# Patient Record
Sex: FEMALE | Race: WHITE | NOT HISPANIC OR LATINO | Employment: OTHER | URBAN - NONMETROPOLITAN AREA
[De-identification: names, ages, dates, MRNs, and addresses within clinical notes are randomized per-mention and may not be internally consistent; named-entity substitution may affect disease eponyms.]

---

## 2017-07-24 ENCOUNTER — HOSPITAL ENCOUNTER (OUTPATIENT)
Dept: LAB | Facility: MEDICAL CENTER | Age: 57
End: 2017-07-24
Attending: SPECIALIST
Payer: COMMERCIAL

## 2017-07-24 ENCOUNTER — NON-PROVIDER VISIT (OUTPATIENT)
Dept: URGENT CARE | Facility: PHYSICIAN GROUP | Age: 57
End: 2017-07-24
Payer: COMMERCIAL

## 2017-07-24 ENCOUNTER — APPOINTMENT (OUTPATIENT)
Dept: RADIOLOGY | Facility: IMAGING CENTER | Age: 57
End: 2017-07-24
Attending: SPECIALIST
Payer: COMMERCIAL

## 2017-07-24 DIAGNOSIS — G89.29 CHRONIC HIP PAIN, UNSPECIFIED LATERALITY: ICD-10-CM

## 2017-07-24 DIAGNOSIS — M25.559 CHRONIC HIP PAIN, UNSPECIFIED LATERALITY: ICD-10-CM

## 2017-07-24 DIAGNOSIS — M25.559 ARTHRALGIA OF HIP, UNSPECIFIED LATERALITY: ICD-10-CM

## 2017-07-24 DIAGNOSIS — G89.29 CHRONIC BACK PAIN, UNSPECIFIED BACK LOCATION, UNSPECIFIED BACK PAIN LATERALITY: ICD-10-CM

## 2017-07-24 DIAGNOSIS — M54.5 CHRONIC LOW BACK PAIN, UNSPECIFIED BACK PAIN LATERALITY, WITH SCIATICA PRESENCE UNSPECIFIED: ICD-10-CM

## 2017-07-24 DIAGNOSIS — M54.9 CHRONIC BACK PAIN, UNSPECIFIED BACK LOCATION, UNSPECIFIED BACK PAIN LATERALITY: ICD-10-CM

## 2017-07-24 DIAGNOSIS — G89.29 CHRONIC LOW BACK PAIN, UNSPECIFIED BACK PAIN LATERALITY, WITH SCIATICA PRESENCE UNSPECIFIED: ICD-10-CM

## 2017-07-24 LAB
ALBUMIN SERPL BCP-MCNC: 3.9 G/DL (ref 3.2–4.9)
ALBUMIN/GLOB SERPL: 1.2 G/DL
ALP SERPL-CCNC: 84 U/L (ref 30–99)
ALT SERPL-CCNC: 31 U/L (ref 2–50)
ANION GAP SERPL CALC-SCNC: 4 MMOL/L (ref 0–11.9)
AST SERPL-CCNC: 29 U/L (ref 12–45)
BASOPHILS # BLD AUTO: 1.1 % (ref 0–1.8)
BASOPHILS # BLD: 0.08 K/UL (ref 0–0.12)
BILIRUB SERPL-MCNC: 0.7 MG/DL (ref 0.1–1.5)
BUN SERPL-MCNC: 14 MG/DL (ref 8–22)
CALCIUM SERPL-MCNC: 9.7 MG/DL (ref 8.5–10.5)
CHLORIDE SERPL-SCNC: 109 MMOL/L (ref 96–112)
CO2 SERPL-SCNC: 29 MMOL/L (ref 20–33)
CREAT SERPL-MCNC: 0.52 MG/DL (ref 0.5–1.4)
EOSINOPHIL # BLD AUTO: 0.23 K/UL (ref 0–0.51)
EOSINOPHIL NFR BLD: 3.2 % (ref 0–6.9)
ERYTHROCYTE [DISTWIDTH] IN BLOOD BY AUTOMATED COUNT: 43.8 FL (ref 35.9–50)
ERYTHROCYTE [SEDIMENTATION RATE] IN BLOOD BY WESTERGREN METHOD: 5 MM/HOUR (ref 0–30)
GFR SERPL CREATININE-BSD FRML MDRD: >60 ML/MIN/1.73 M 2
GLOBULIN SER CALC-MCNC: 3.2 G/DL (ref 1.9–3.5)
GLUCOSE SERPL-MCNC: 110 MG/DL (ref 65–99)
HCT VFR BLD AUTO: 46.7 % (ref 37–47)
HGB BLD-MCNC: 15.6 G/DL (ref 12–16)
IMM GRANULOCYTES # BLD AUTO: 0.01 K/UL (ref 0–0.11)
IMM GRANULOCYTES NFR BLD AUTO: 0.1 % (ref 0–0.9)
LYMPHOCYTES # BLD AUTO: 3.57 K/UL (ref 1–4.8)
LYMPHOCYTES NFR BLD: 50.2 % (ref 22–41)
MCH RBC QN AUTO: 31.8 PG (ref 27–33)
MCHC RBC AUTO-ENTMCNC: 33.4 G/DL (ref 33.6–35)
MCV RBC AUTO: 95.3 FL (ref 81.4–97.8)
MONOCYTES # BLD AUTO: 0.48 K/UL (ref 0–0.85)
MONOCYTES NFR BLD AUTO: 6.8 % (ref 0–13.4)
NEUTROPHILS # BLD AUTO: 2.74 K/UL (ref 2–7.15)
NEUTROPHILS NFR BLD: 38.6 % (ref 44–72)
NRBC # BLD AUTO: 0 K/UL
NRBC BLD AUTO-RTO: 0 /100 WBC
PLATELET # BLD AUTO: 280 K/UL (ref 164–446)
PMV BLD AUTO: 9.5 FL (ref 9–12.9)
POTASSIUM SERPL-SCNC: 4.4 MMOL/L (ref 3.6–5.5)
PROT SERPL-MCNC: 7.1 G/DL (ref 6–8.2)
RBC # BLD AUTO: 4.9 M/UL (ref 4.2–5.4)
RHEUMATOID FACT SER IA-ACNC: <10 IU/ML (ref 0–14)
SODIUM SERPL-SCNC: 142 MMOL/L (ref 135–145)
TSH SERPL DL<=0.005 MIU/L-ACNC: 1.29 UIU/ML (ref 0.3–3.7)
WBC # BLD AUTO: 7.1 K/UL (ref 4.8–10.8)

## 2017-07-24 PROCEDURE — 36415 COLL VENOUS BLD VENIPUNCTURE: CPT | Mod: GA

## 2017-07-24 PROCEDURE — 84443 ASSAY THYROID STIM HORMONE: CPT | Mod: GA

## 2017-07-24 PROCEDURE — 86431 RHEUMATOID FACTOR QUANT: CPT

## 2017-07-24 PROCEDURE — 82652 VIT D 1 25-DIHYDROXY: CPT | Mod: GA

## 2017-07-24 PROCEDURE — 72100 X-RAY EXAM L-S SPINE 2/3 VWS: CPT | Mod: TC | Performed by: PHYSICIAN ASSISTANT

## 2017-07-24 PROCEDURE — 80053 COMPREHEN METABOLIC PANEL: CPT

## 2017-07-24 PROCEDURE — 86038 ANTINUCLEAR ANTIBODIES: CPT

## 2017-07-24 PROCEDURE — 85025 COMPLETE CBC W/AUTO DIFF WBC: CPT | Mod: GA

## 2017-07-24 PROCEDURE — 72202 X-RAY EXAM SI JOINTS 3/> VWS: CPT | Mod: TC | Performed by: PHYSICIAN ASSISTANT

## 2017-07-24 PROCEDURE — 85652 RBC SED RATE AUTOMATED: CPT

## 2017-07-26 LAB
1,25(OH)2D3 SERPL-MCNC: 37 PG/ML (ref 19.9–79.3)
NUCLEAR IGG SER QL IA: NORMAL

## 2018-01-29 ENCOUNTER — NON-PROVIDER VISIT (OUTPATIENT)
Dept: URGENT CARE | Facility: PHYSICIAN GROUP | Age: 58
End: 2018-01-29
Payer: MEDICARE

## 2018-01-29 ENCOUNTER — APPOINTMENT (OUTPATIENT)
Dept: RADIOLOGY | Facility: IMAGING CENTER | Age: 58
End: 2018-01-29
Attending: SPECIALIST
Payer: COMMERCIAL

## 2018-01-29 DIAGNOSIS — R07.9 CHEST PAIN, UNSPECIFIED TYPE: ICD-10-CM

## 2018-01-29 PROCEDURE — 71046 X-RAY EXAM CHEST 2 VIEWS: CPT | Mod: 26 | Performed by: PHYSICIAN ASSISTANT

## 2018-01-31 ENCOUNTER — OFFICE VISIT (OUTPATIENT)
Dept: URGENT CARE | Facility: PHYSICIAN GROUP | Age: 58
End: 2018-01-31
Payer: COMMERCIAL

## 2018-01-31 VITALS
SYSTOLIC BLOOD PRESSURE: 126 MMHG | BODY MASS INDEX: 21.06 KG/M2 | DIASTOLIC BLOOD PRESSURE: 78 MMHG | OXYGEN SATURATION: 98 % | HEIGHT: 65 IN | TEMPERATURE: 98.6 F | HEART RATE: 82 BPM | WEIGHT: 126.4 LBS | RESPIRATION RATE: 12 BRPM

## 2018-01-31 DIAGNOSIS — R05.9 COUGH: ICD-10-CM

## 2018-01-31 DIAGNOSIS — Z71.6 ENCOUNTER FOR SMOKING CESSATION COUNSELING: ICD-10-CM

## 2018-01-31 DIAGNOSIS — R06.02 SHORTNESS OF BREATH: ICD-10-CM

## 2018-01-31 DIAGNOSIS — M94.0 COSTOCHONDRITIS: ICD-10-CM

## 2018-01-31 PROCEDURE — 99406 BEHAV CHNG SMOKING 3-10 MIN: CPT | Performed by: NURSE PRACTITIONER

## 2018-01-31 PROCEDURE — 99204 OFFICE O/P NEW MOD 45 MIN: CPT | Mod: 25 | Performed by: NURSE PRACTITIONER

## 2018-01-31 RX ORDER — ALBUTEROL SULFATE 90 UG/1
2 AEROSOL, METERED RESPIRATORY (INHALATION) EVERY 6 HOURS PRN
Qty: 1 INHALER | Refills: 0 | Status: SHIPPED | OUTPATIENT
Start: 2018-01-31

## 2018-01-31 ASSESSMENT — ENCOUNTER SYMPTOMS
SORE THROAT: 0
MYALGIAS: 0
SHORTNESS OF BREATH: 0
JOINT SWELLING: 0
EYE PAIN: 0
DIZZINESS: 0
CHILLS: 0
WEAKNESS: 0
FEVER: 0
COUGH: 1
NAUSEA: 0
PAIN: 1
NUMBNESS: 0
VOMITING: 0

## 2018-01-31 NOTE — PATIENT INSTRUCTIONS
Costochondritis  Costochondritis, sometimes called Tietze syndrome, is a swelling and irritation (inflammation) of the tissue (cartilage) that connects your ribs with your breastbone (sternum). It causes pain in the chest and rib area. Costochondritis usually goes away on its own over time. It can take up to 6 weeks or longer to get better, especially if you are unable to limit your activities.  CAUSES   Some cases of costochondritis have no known cause. Possible causes include:  · Injury (trauma).  · Exercise or activity such as lifting.  · Severe coughing.  SIGNS AND SYMPTOMS  · Pain and tenderness in the chest and rib area.  · Pain that gets worse when coughing or taking deep breaths.  · Pain that gets worse with specific movements.  DIAGNOSIS   Your health care provider will do a physical exam and ask about your symptoms. Chest X-rays or other tests may be done to rule out other problems.  TREATMENT   Costochondritis usually goes away on its own over time. Your health care provider may prescribe medicine to help relieve pain.  HOME CARE INSTRUCTIONS   · Avoid exhausting physical activity. Try not to strain your ribs during normal activity. This would include any activities using chest, abdominal, and side muscles, especially if heavy weights are used.  · Apply ice to the affected area for the first 2 days after the pain begins.  ¨ Put ice in a plastic bag.  ¨ Place a towel between your skin and the bag.  ¨ Leave the ice on for 20 minutes, 2-3 times a day.  · Only take over-the-counter or prescription medicines as directed by your health care provider.  SEEK MEDICAL CARE IF:  · You have redness or swelling at the rib joints. These are signs of infection.  · Your pain does not go away despite rest or medicine.  SEEK IMMEDIATE MEDICAL CARE IF:   · Your pain increases or you are very uncomfortable.  · You have shortness of breath or difficulty breathing.  · You cough up blood.  · You have worse chest pains,  sweating, or vomiting.  · You have a fever or persistent symptoms for more than 2-3 days.  · You have a fever and your symptoms suddenly get worse.  MAKE SURE YOU:   · Understand these instructions.  · Will watch your condition.  · Will get help right away if you are not doing well or get worse.     This information is not intended to replace advice given to you by your health care provider. Make sure you discuss any questions you have with your health care provider.     Document Released: 09/27/2006 Document Revised: 10/08/2014 Document Reviewed: 07/22/2014  [employer] Patient Education ©2016 [employer]

## 2018-01-31 NOTE — PROGRESS NOTES
Subjective:     Alexandria Nevarez is a 57 y.o. female who presents for Rib Injury (Rt side, painful)       Pain   This is a new problem. The current episode started in the past 7 days. The problem occurs constantly. The problem has been unchanged. Associated symptoms include coughing. Pertinent negatives include no chest pain, chills, fever, joint swelling, myalgias, nausea, numbness, rash, sore throat, vomiting or weakness. The symptoms are aggravated by walking, twisting, standing, bending and coughing. She has tried nothing for the symptoms. The treatment provided no relief.   Cough   This is a new problem. The current episode started in the past 7 days. The problem has been unchanged. The problem occurs constantly. The cough is non-productive. Pertinent negatives include no chest pain, chills, fever, myalgias, rash, sore throat or shortness of breath. Nothing aggravates the symptoms. Risk factors for lung disease include smoking/tobacco exposure. She has tried nothing for the symptoms. The treatment provided no relief. There is no history of bronchitis or pneumonia.     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 at SURGERY [facility]   • CERVICAL FUSION POSTERIOR  4/27/2011    Performed by JOEL BLACK at SURGERY [facility]   • CERVICAL LAMINECTOMY POSTERIOR  4/27/2011    Performed by JOEL BLACK at SURGERY [facility]   • CERVICAL DECOMPRESSION POSTERIOR  4/27/2011    Performed by JOEL BLACK  at SURGERY [facility] ORS   • INJ,SACROILIAC,ANES/STEROID Bilateral 1/21/2011    Procedure: W/FLUORO;  Surgeon: Ben Matute D.O.;  Location: SURGERY [facility];  Service: Pain Management   • FLUOROGUIDE FOR SPINAL INJECT  1/21/2011    Procedure: FLUOROGUIDE FOR SPINAL INJ;  Surgeon: Ben Matute D.O.;  Location: SURGERY [facility];  Service:    • LARYNGOSCOPY  9/23/2010    Performed by THOMAS BLAKC at SURGERY SAME DAY [facility] ORS   • VOCAL CORD STRIPPING  9/23/2010    Performed by THOMAS BLACK at SURGERY SAME DAY [facility] ORS   • OTHER ORTHOPEDIC SURGERY  2002    rig hip replacement   • ABDOMINAL HYSTERECTOMY TOTAL  1980, 1995    cervical cancer   • HIP ARTHROPLASTY TOTAL      RIGHT     Social History     Social History   • Marital status:      Spouse name: N/A   • Number of children: N/A   • Years of education: N/A     Occupational History   • Not on file.     Social History Main Topics   • Smoking status: Current Every Day Smoker     Packs/day: 1.00     Years: 20.00     Types: Cigarettes   • Smokeless tobacco: Never Used      Comment: 1 1/2 pack/day FOR 25 YEARS    • Alcohol use No   • Drug use: No   • Sexual activity: No     Other Topics Concern   • Not on file     Social History Narrative   • No narrative on file      Family History   Problem Relation Age of Onset   • Diabetes     • Heart Disease     • Stroke     • Arthritis      Review of Systems   Constitutional: Negative for chills and fever.   HENT: Negative for sore throat.    Eyes: Negative for pain.   Respiratory: Positive for cough. Negative for shortness of breath.    Cardiovascular: Negative for chest pain.   Gastrointestinal: Negative for nausea and vomiting.   Genitourinary: Negative for hematuria.   Musculoskeletal: Negative for joint swelling and myalgias.        Right rib pain     Skin: Negative for rash.   Neurological: Negative for dizziness, weakness and numbness.     Allergies   Allergen Reactions   •  "Asa [Aspirin]      Nausea and vomiting, GI bleed   • Elavil [Amitriptyline Hcl]      GI upset   • Gabapentin Vomiting   • Nsaids      GI Bleeding, Nausea/Vomiting      Objective:   /78   Pulse 82   Temp 37 °C (98.6 °F)   Resp 12   Ht 1.651 m (5' 5\")   Wt 57.3 kg (126 lb 6.4 oz)   SpO2 98%   BMI 21.03 kg/m²   Physical Exam   Constitutional: She is oriented to person, place, and time. She appears well-developed and well-nourished. No distress.   HENT:   Head: Normocephalic and atraumatic.   Right Ear: Tympanic membrane normal.   Left Ear: Tympanic membrane normal.   Nose: Nose normal. Right sinus exhibits no maxillary sinus tenderness and no frontal sinus tenderness. Left sinus exhibits no maxillary sinus tenderness and no frontal sinus tenderness.   Mouth/Throat: Uvula is midline, oropharynx is clear and moist and mucous membranes are normal. No posterior oropharyngeal edema, posterior oropharyngeal erythema or tonsillar abscesses. No tonsillar exudate.   Eyes: Conjunctivae and EOM are normal. Pupils are equal, round, and reactive to light. Right eye exhibits no discharge. Left eye exhibits no discharge.   Cardiovascular: Normal rate and regular rhythm.    No murmur heard.  Pulmonary/Chest: Effort normal and breath sounds normal. No respiratory distress. She has no decreased breath sounds. She has no wheezes. She has no rhonchi. She has no rales.   Abdominal: Soft. She exhibits no distension. There is no tenderness.   Musculoskeletal:   Significant amount of tenderness and pain elicited on palpation of the right ribs and intercostal spaces. No edema, swelling, erythema, ecchymosis noted.   Neurological: She is alert and oriented to person, place, and time. She has normal reflexes. No sensory deficit.   Skin: Skin is warm, dry and intact.   Psychiatric: She has a normal mood and affect.         Assessment/Plan:   Assessment    1. Costochondritis  2. Cough  3. Shortness of breath  - albuterol 108 (90 Base) " MCG/ACT Aero Soln inhalation aerosol; Inhale 2 Puffs by mouth every 6 hours as needed for Shortness of Breath.  Dispense: 1 Inhaler; Refill: 0    Lung sounds clear to auscultation bilaterally, patient had x-ray done on 1/29/18  Xray results  The heart is normal in size.  No pulmonary infiltrates or consolidations are noted.  No pleural effusions are appreciated.  There is biapical pleural-parenchymal scarring. There is again scarring involving the parenchyma and pleura in the right lung base. No pneumothorax. No rib fracture.  Advised patient to use over-the-counter cough suppressants. Patient unable to tolerate taking aspirin and/or ibuprofen. Patient does have prescriptions for narcotics. Advised patient to rest, gentle stretching, time for pain to subside. Provided patient with education handout of diagnoses.    Patient declined referral for pulmonologist. Patient does not have the money at this time to be seen by a specialist for ongoing shortness of breath. Patient isn't current every day smoker and not want to help quitting.     4. Encounter for smoking cessation counseling  Smoking cessation was discussed today for longer than 3 min. OTC Smoking cessation aids were discussed and pt. will consider such, however is not ready to quit at this time.       Patient given precautionary s/sx that mandate immediate follow up and evaluation in the ED. Advised of risks of not doing so.    DDX, Supportive care, and indications for immediate follow-up discussed with patient.    Instructed to return to clinic or nearest emergency department if we are not available for any change in condition, further concerns, or worsening of symptoms.    The patient demonstrated a good understanding and agreed with the treatment plan.

## 2018-03-28 ENCOUNTER — HOSPITAL ENCOUNTER (OUTPATIENT)
Dept: LAB | Facility: MEDICAL CENTER | Age: 58
End: 2018-03-28
Attending: PHYSICIAN ASSISTANT
Payer: COMMERCIAL

## 2018-03-28 PROCEDURE — 36415 COLL VENOUS BLD VENIPUNCTURE: CPT

## 2018-03-28 PROCEDURE — 82495 ASSAY OF CHROMIUM: CPT

## 2018-03-30 LAB — CR SERPL-MCNC: <1 UG/L

## 2018-04-11 ENCOUNTER — APPOINTMENT (OUTPATIENT)
Dept: RADIOLOGY | Facility: MEDICAL CENTER | Age: 58
End: 2018-04-11
Attending: PHYSICIAN ASSISTANT
Payer: COMMERCIAL

## 2019-03-06 ENCOUNTER — APPOINTMENT (OUTPATIENT)
Dept: RADIOLOGY | Facility: IMAGING CENTER | Age: 59
End: 2019-03-06
Attending: SPECIALIST
Payer: COMMERCIAL

## 2019-03-06 ENCOUNTER — NON-PROVIDER VISIT (OUTPATIENT)
Dept: URGENT CARE | Facility: PHYSICIAN GROUP | Age: 59
End: 2019-03-06
Payer: COMMERCIAL

## 2019-03-06 DIAGNOSIS — M25.552 PAIN OF BOTH HIP JOINTS: ICD-10-CM

## 2019-03-06 DIAGNOSIS — M25.551 PAIN OF BOTH HIP JOINTS: ICD-10-CM

## 2019-03-06 PROCEDURE — 73522 X-RAY EXAM HIPS BI 3-4 VIEWS: CPT | Mod: TC,FY | Performed by: FAMILY MEDICINE

## 2019-08-22 ENCOUNTER — OFFICE VISIT (OUTPATIENT)
Dept: MEDICAL GROUP | Facility: PHYSICIAN GROUP | Age: 59
End: 2019-08-22
Payer: COMMERCIAL

## 2019-08-22 VITALS
WEIGHT: 128 LBS | SYSTOLIC BLOOD PRESSURE: 120 MMHG | HEART RATE: 75 BPM | RESPIRATION RATE: 12 BRPM | BODY MASS INDEX: 21.33 KG/M2 | HEIGHT: 65 IN | TEMPERATURE: 99.4 F | DIASTOLIC BLOOD PRESSURE: 82 MMHG | OXYGEN SATURATION: 97 %

## 2019-08-22 DIAGNOSIS — M25.551 RIGHT HIP PAIN: ICD-10-CM

## 2019-08-22 DIAGNOSIS — Z85.41 HISTORY OF CERVICAL CANCER: ICD-10-CM

## 2019-08-22 DIAGNOSIS — T56.894S: ICD-10-CM

## 2019-08-22 DIAGNOSIS — Z13.6 SCREENING FOR CARDIOVASCULAR CONDITION: ICD-10-CM

## 2019-08-22 DIAGNOSIS — Z12.39 BREAST CANCER SCREENING: ICD-10-CM

## 2019-08-22 DIAGNOSIS — N95.0 POSTMENOPAUSAL BLEEDING: ICD-10-CM

## 2019-08-22 DIAGNOSIS — Z13.0 ENCOUNTER FOR SCREENING FOR HEMATOLOGIC DISORDER: ICD-10-CM

## 2019-08-22 PROCEDURE — 99214 OFFICE O/P EST MOD 30 MIN: CPT | Performed by: NURSE PRACTITIONER

## 2019-08-22 RX ORDER — BACLOFEN 10 MG/1
TABLET ORAL
Refills: 2 | COMMUNITY
Start: 2019-08-15

## 2019-08-22 RX ORDER — TRAMADOL HYDROCHLORIDE 50 MG/1
TABLET ORAL
Refills: 2 | COMMUNITY
Start: 2019-08-16

## 2019-08-22 ASSESSMENT — PAIN SCALES - GENERAL: PAINLEVEL: 9=SEVERE PAIN

## 2019-08-22 ASSESSMENT — PATIENT HEALTH QUESTIONNAIRE - PHQ9: CLINICAL INTERPRETATION OF PHQ2 SCORE: 0

## 2019-08-22 NOTE — PROGRESS NOTES
"Chief Complaint   Patient presents with   • Vaginal Pain     bleeding after sex         This is a 59 y.o.female patient that presents today with the following: vaginal bleeding after intercourse.  Establish care    Postmenopausal bleeding  Patient having pain and bleeding after intercourse. This has been going on for the past 2 years, worsening as of late, happening everytime after intercourse. Does not have bleed when not active. Does have vaginal pain off and on, even when not having intercourse. Reports intercourse that is not \"rough\" no other equipment used.  Upon physical examination with speculum there is no evidence of abrasion or source of bleeding.  Did discuss with her in the setting of her history of cervical cancer as well as bleeding with intercourse will refer to OB/GYN for further evaluation I will also get pelvic ultrasound.    History of Cervical Cancer  See additional notes, patient does have history of cervical cancer in her 20s in which she underwent partial hysterectomy and then a subsequent full hysterectomy.  She is not currently followed by OB/GYN, referral has been placed especially in the setting of vaginal bleeding after intercourse.    Right Hip Pain  Patient does have chronic pain in her right hip status post hip replacement, she reports to me that she was also exposed to cobalt because of the hip implants and is requesting a cobalt level, this is been ordered.  She is also followed by pain management, on tramadol.    Poisoning, cobalt  See additional notes, patient states she is exposed to cobalt with her hip implants and she has routine cobalt levels drawn, lab ordered.      No visits with results within 1 Month(s) from this visit.   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 at SURGERY [facility] ORS   • CERVICAL FUSION POSTERIOR  4/27/2011    Performed by JOEL BLACK at SURGERY [facility] ORS   • CERVICAL LAMINECTOMY POSTERIOR  4/27/2011    Performed by JOEL BLACK at [facility]   • CERVICAL DECOMPRESSION POSTERIOR  4/27/2011    Performed by JOEL BLACK at [facility]   • INJ,SACROILIAC,ANES/STEROID Bilateral 1/21/2011    Procedure: W/FLUORO;  Surgeon: Ben Matute D.O.;  Location: SURGERY [facility] ORS;  Service: Pain Management   • FLUOROGUIDE FOR SPINAL INJECT  1/21/2011    Procedure: FLUOROGUIDE FOR SPINAL INJ;  Surgeon: Ben Matute D.O.;  Location: SURGERY [facility];  Service:    • LARYNGOSCOPY  9/23/2010    Performed by THOMAS BLACK at SURGERY SAME DAY [facility] ORS   • VOCAL CORD STRIPPING  9/23/2010    Performed by THOMAS BLACK at SURGERY SAME DAY [facility] ORS   • OTHER ORTHOPEDIC SURGERY  2002    rig hip replacement   • ABDOMINAL HYSTERECTOMY TOTAL  1980, 1995    cervical cancer   • HIP ARTHROPLASTY TOTAL      RIGHT       Family History   Problem Relation Age of Onset   • Diabetes Unknown    • Heart Disease Unknown    • Stroke Unknown    • Arthritis Unknown        Asa [aspirin]; Elavil [amitriptyline hcl]; Gabapentin; and Nsaids    Current Outpatient Medications Ordered in Epic   Medication Sig Dispense Refill   • tramadol (ULTRAM) 50 MG Tab TAKE 1 TABLET BY MOUTH FOUR TIMES A DAY. DNF 08/16,M96.1  2   • baclofen (LIORESAL) 10 MG Tab TAKE 1 TABLET BY  "MOUTH THREE TIMES A DAY FOR MUSCLE RELAXATION  2   • Amitriptyline HCl (ELAVIL PO) Take  by mouth.     • albuterol 108 (90 Base) MCG/ACT Aero Soln inhalation aerosol Inhale 2 Puffs by mouth every 6 hours as needed for Shortness of Breath. (Patient not taking: Reported on 8/22/2019) 1 Inhaler 0   • oxycodone (OXY-IR) 15 MG immediate release tablet Take 15 mg by mouth every 8 hours as needed.     • morphine SR (MS CONTIN) 30 MG TBCR tablet Take 30 mg by mouth 3 times a day.       No current [facility]-ordered facility-administered medications on file.        Constitutional ROS: No unexpected change in weight, No weakness, No unexplained fevers, sweats, or chills  Pulmonary ROS: No chronic cough, sputum, or hemoptysis, No shortness of breath, No recent change in breathing, Positive for smoker   Cardiovascular ROS: No chest pain, No edema, No palpitations  Gastrointestinal ROS: No abdominal pain, No nausea, vomiting, diarrhea, or constipation  Musculoskeletal/Extremities ROS: positive for chronic hip pain  Neurologic ROS: Normal development, No seizures, No weakness   ROS: positive per HPI    Physical exam:  /82 (BP Location: Right arm, Patient Position: Sitting, BP Cuff Size: Adult)   Pulse 75   Temp 37.4 °C (99.4 °F) (Temporal)   Resp 12   Ht 1.651 m (5' 5\")   Wt 58.1 kg (128 lb)   SpO2 97%   Breastfeeding? No   BMI 21.30 kg/m²   General Appearance: pleasant older female appearing older than stated age, alert, no distress, well nourished, well groomed  Skin: Skin color, texture, turgor normal. No rashes or lesions.  Lungs: negative findings: normal respiratory rate and rhythm, normal effort  Musculoskeletal: positive findings: limited ROM to bilateral hips, impaired gait, ambulating with cane  Neurologic: intact  Pelvic: negative findings: external genitalia, cervix surgically removed, no evidence of source of bleeding.     Medical decision making/discussion: pt will be referred to GYN in the setting of " post-coital bleeding as well as history of cervical cancer. Will also get pelvic US. Multiple labs ordered in addition to cobalt levels as mentioned above. Due for mammogram, this was ordered. She will follow up with me in 6-8 weeks, sooner if needed    Alexandria was seen today for vaginal pain.    Diagnoses and all orders for this visit:    Postmenopausal bleeding  -     US-PELVIC TRANSVAGINAL ONLY; Future  -     REFERRAL TO GYNECOLOGY    History of cervical cancer  -     US-PELVIC TRANSVAGINAL ONLY; Future  -     REFERRAL TO GYNECOLOGY    Right hip pain    [place] poisoning, undetermined intent, sequela  -     COBALT, BLOOD; Future    Breast cancer screening  -     MA-SCREEN MAMMO W/CAD-BILAT; Future    Screening for cardiovascular condition  -     Comp Metabolic Panel; Future  -     Lipid Profile; Future    Encounter for screening for hematologic disorder  -     CBC WITH DIFFERENTIAL; Future        Return in about 6 weeks (around 10/3/2019) for Follow-up, Discuss Labs.        Please note that this dictation was created using voice recognition software. I have made every reasonable attempt to correct obvious errors, but I expect that there are errors of grammar and possibly content that I did not discover before finalizing the note.

## 2019-08-22 NOTE — ASSESSMENT & PLAN NOTE
"Patient having pain and bleeding after intercourse. This has been going on for the past 2 years, worsening as of late, happening everytime after intercourse. Does not have bleed when not active. Does have vaginal pain off and on, even when not having intercourse. Reports intercourse that is not \"rough\" no other equipment used.  Upon physical examination with speculum there is no evidence of abrasion or source of bleeding.  Did discuss with her in the setting of her history of cervical cancer as well as bleeding with intercourse will refer to OB/GYN for further evaluation I will also get pelvic ultrasound.  "

## 2019-08-26 PROBLEM — T56.891A: Status: ACTIVE | Noted: 2019-08-26

## 2019-08-26 NOTE — ASSESSMENT & PLAN NOTE
See additional notes, patient states she is exposed to cobalt with her hip implants and she has routine cobalt levels drawn, lab ordered.

## 2019-08-26 NOTE — ASSESSMENT & PLAN NOTE
See additional notes, patient does have history of cervical cancer in her 20s in which she underwent partial hysterectomy and then a subsequent full hysterectomy.  She is not currently followed by OB/GYN, referral has been placed especially in the setting of vaginal bleeding after intercourse.

## 2019-08-26 NOTE — ASSESSMENT & PLAN NOTE
Patient does have chronic pain in her right hip status post hip replacement, she reports to me that she was also exposed to cobalt because of the hip implants and is requesting a cobalt level, this is been ordered.  She is also followed by pain management, on tramadol.

## 2019-09-04 ENCOUNTER — HOSPITAL ENCOUNTER (OUTPATIENT)
Dept: LAB | Facility: MEDICAL CENTER | Age: 59
End: 2019-09-04
Attending: NURSE PRACTITIONER
Payer: COMMERCIAL

## 2019-09-04 DIAGNOSIS — T56.894S: ICD-10-CM

## 2019-09-04 DIAGNOSIS — Z13.6 SCREENING FOR CARDIOVASCULAR CONDITION: ICD-10-CM

## 2019-09-04 DIAGNOSIS — Z13.0 ENCOUNTER FOR SCREENING FOR HEMATOLOGIC DISORDER: ICD-10-CM

## 2019-09-04 LAB
ALBUMIN SERPL BCP-MCNC: 4.2 G/DL (ref 3.2–4.9)
ALBUMIN/GLOB SERPL: 1.5 G/DL
ALP SERPL-CCNC: 83 U/L (ref 30–99)
ALT SERPL-CCNC: 55 U/L (ref 2–50)
ANION GAP SERPL CALC-SCNC: 6 MMOL/L (ref 0–11.9)
AST SERPL-CCNC: 49 U/L (ref 12–45)
BASOPHILS # BLD AUTO: 0.8 % (ref 0–1.8)
BASOPHILS # BLD: 0.06 K/UL (ref 0–0.12)
BILIRUB SERPL-MCNC: 0.7 MG/DL (ref 0.1–1.5)
BUN SERPL-MCNC: 12 MG/DL (ref 8–22)
CALCIUM SERPL-MCNC: 9.6 MG/DL (ref 8.5–10.5)
CHLORIDE SERPL-SCNC: 106 MMOL/L (ref 96–112)
CHOLEST SERPL-MCNC: 151 MG/DL (ref 100–199)
CO2 SERPL-SCNC: 30 MMOL/L (ref 20–33)
CREAT SERPL-MCNC: 0.57 MG/DL (ref 0.5–1.4)
EOSINOPHIL # BLD AUTO: 0.49 K/UL (ref 0–0.51)
EOSINOPHIL NFR BLD: 6.2 % (ref 0–6.9)
ERYTHROCYTE [DISTWIDTH] IN BLOOD BY AUTOMATED COUNT: 47.8 FL (ref 35.9–50)
FASTING STATUS PATIENT QL REPORTED: NORMAL
GLOBULIN SER CALC-MCNC: 2.8 G/DL (ref 1.9–3.5)
GLUCOSE SERPL-MCNC: 98 MG/DL (ref 65–99)
HCT VFR BLD AUTO: 46.4 % (ref 37–47)
HDLC SERPL-MCNC: 76 MG/DL
HGB BLD-MCNC: 15.5 G/DL (ref 12–16)
IMM GRANULOCYTES # BLD AUTO: 0.01 K/UL (ref 0–0.11)
IMM GRANULOCYTES NFR BLD AUTO: 0.1 % (ref 0–0.9)
LDLC SERPL CALC-MCNC: 50 MG/DL
LYMPHOCYTES # BLD AUTO: 3.63 K/UL (ref 1–4.8)
LYMPHOCYTES NFR BLD: 45.8 % (ref 22–41)
MCH RBC QN AUTO: 33.3 PG (ref 27–33)
MCHC RBC AUTO-ENTMCNC: 33.4 G/DL (ref 33.6–35)
MCV RBC AUTO: 99.8 FL (ref 81.4–97.8)
MONOCYTES # BLD AUTO: 0.64 K/UL (ref 0–0.85)
MONOCYTES NFR BLD AUTO: 8.1 % (ref 0–13.4)
NEUTROPHILS # BLD AUTO: 3.09 K/UL (ref 2–7.15)
NEUTROPHILS NFR BLD: 39 % (ref 44–72)
NRBC # BLD AUTO: 0.03 K/UL
NRBC BLD-RTO: 0.4 /100 WBC
PLATELET # BLD AUTO: 280 K/UL (ref 164–446)
PMV BLD AUTO: 10.5 FL (ref 9–12.9)
POTASSIUM SERPL-SCNC: 3.4 MMOL/L (ref 3.6–5.5)
PROT SERPL-MCNC: 7 G/DL (ref 6–8.2)
RBC # BLD AUTO: 4.65 M/UL (ref 4.2–5.4)
SODIUM SERPL-SCNC: 142 MMOL/L (ref 135–145)
TRIGL SERPL-MCNC: 126 MG/DL (ref 0–149)
WBC # BLD AUTO: 7.9 K/UL (ref 4.8–10.8)

## 2019-09-04 PROCEDURE — 80053 COMPREHEN METABOLIC PANEL: CPT

## 2019-09-04 PROCEDURE — 80061 LIPID PANEL: CPT

## 2019-09-04 PROCEDURE — 85025 COMPLETE CBC W/AUTO DIFF WBC: CPT | Mod: GA

## 2019-09-04 PROCEDURE — 83018 HEAVY METAL QUAN EACH NES: CPT

## 2019-09-04 PROCEDURE — 36415 COLL VENOUS BLD VENIPUNCTURE: CPT

## 2019-09-06 LAB — COBALT BLD-MCNC: <1 UG/L

## 2019-09-11 ENCOUNTER — HOSPITAL ENCOUNTER (OUTPATIENT)
Dept: RADIOLOGY | Facility: MEDICAL CENTER | Age: 59
End: 2019-09-11
Attending: NURSE PRACTITIONER
Payer: COMMERCIAL

## 2019-09-11 DIAGNOSIS — Z12.39 BREAST CANCER SCREENING: ICD-10-CM

## 2019-09-11 DIAGNOSIS — Z85.41 HISTORY OF CERVICAL CANCER: ICD-10-CM

## 2019-09-11 DIAGNOSIS — N95.0 POSTMENOPAUSAL BLEEDING: ICD-10-CM

## 2019-09-11 PROCEDURE — 76830 TRANSVAGINAL US NON-OB: CPT

## 2019-09-11 PROCEDURE — 77063 BREAST TOMOSYNTHESIS BI: CPT

## 2019-09-12 ENCOUNTER — TELEPHONE (OUTPATIENT)
Dept: MEDICAL GROUP | Facility: PHYSICIAN GROUP | Age: 59
End: 2019-09-12

## 2019-09-12 NOTE — TELEPHONE ENCOUNTER
Phone Number Called: [phone] (home)       Call outcome: spoke to patient regarding message below    Message: patient advised and says thank you.

## 2019-09-12 NOTE — TELEPHONE ENCOUNTER
----- Message from Alexandria Nevarez sent at 9/12/2019  5:00 AM PDT -----  Regarding: Lab results  Alexandria,  I have reviewed your labs and overall they look good, they are stable.  You have 2 liver tests that are very mildly elevated but we will monitor and we will discuss this further at your upcoming appointment with me in October.  Shirley

## 2019-09-12 NOTE — TELEPHONE ENCOUNTER
I sent the below info to patient via a [employer] message a week ago--pt still has not read message. Please call pt with message. Thank you.

## 2019-09-24 ENCOUNTER — TELEPHONE (OUTPATIENT)
Dept: MEDICAL GROUP | Facility: PHYSICIAN GROUP | Age: 59
End: 2019-09-24

## 2019-09-24 NOTE — TELEPHONE ENCOUNTER
----- Message from Alexandria Nevarez sent at 9/24/2019  5:00 AM PDT -----  Regarding: Results  Alexandria,  I have reviewed the results of you recent mammogram. It is normal, there is no evidence of malignancy.I have reviewed your mammogram results. And while there is no evidence of malignancy, your breasts were found to be dense. When the breasts are dense, sometimes small masses can be missed. There is a test called a Sonocine, which can be done in such cases. However, it is often not covered by insurances. I believe out of pocket cost is around $125. If you would like to have this done, I would encourage you to call your insurance company to see if it would be covered. Otherwise, we will repeat your mammogram in one year.   If you have any questions or concerns, please do not hesitate to call or send me a message.  Sincerely,  IRIS Weiss

## 2019-09-24 NOTE — TELEPHONE ENCOUNTER
I sent the below info to patient via a [employer] message a week ago--pt still has not read message. Please call pt with message. Thank you.    This can also be sent in letter

## 2019-09-24 NOTE — TELEPHONE ENCOUNTER
Phone Number Called: [phone] (home)       Call outcome: left message for patient to call back regarding message below    Message: need to go over mammogram results-also advised results can be viewed through [employer].

## 2019-09-26 NOTE — TELEPHONE ENCOUNTER
Phone Number Called: [phone] (home)       Call outcome: left message for patient to call back regarding message below    Message: Patient advised and declines the Sonocine. Thank you.

## 2019-10-17 ENCOUNTER — GYNECOLOGY VISIT (OUTPATIENT)
Dept: OBGYN | Facility: CLINIC | Age: 59
End: 2019-10-17
Payer: COMMERCIAL

## 2019-10-17 VITALS — WEIGHT: 130 LBS | DIASTOLIC BLOOD PRESSURE: 64 MMHG | SYSTOLIC BLOOD PRESSURE: 110 MMHG | BODY MASS INDEX: 21.63 KG/M2

## 2019-10-17 DIAGNOSIS — N95.2 VAGINAL ATROPHY: ICD-10-CM

## 2019-10-17 DIAGNOSIS — N95.0 PMB (POSTMENOPAUSAL BLEEDING): ICD-10-CM

## 2019-10-17 DIAGNOSIS — Z78.0 MENOPAUSE: ICD-10-CM

## 2019-10-17 DIAGNOSIS — N93.0 PCB (POST COITAL BLEEDING): ICD-10-CM

## 2019-10-17 LAB
APPEARANCE UR: NORMAL
BILIRUB UR STRIP-MCNC: NORMAL MG/DL
COLOR UR AUTO: NORMAL
GLUCOSE UR STRIP.AUTO-MCNC: NEGATIVE MG/DL
KETONES UR STRIP.AUTO-MCNC: NEGATIVE MG/DL
LEUKOCYTE ESTERASE UR QL STRIP.AUTO: NORMAL
NITRITE UR QL STRIP.AUTO: NEGATIVE
PH UR STRIP.AUTO: 5 [PH] (ref 5–8)
PROT UR QL STRIP: NEGATIVE MG/DL
RBC UR QL AUTO: NEGATIVE
SP GR UR STRIP.AUTO: 1.01
UROBILINOGEN UR STRIP-MCNC: NORMAL MG/DL

## 2019-10-17 PROCEDURE — 99203 OFFICE O/P NEW LOW 30 MIN: CPT | Performed by: OBSTETRICS & GYNECOLOGY

## 2019-10-17 PROCEDURE — 81002 URINALYSIS NONAUTO W/O SCOPE: CPT | Performed by: OBSTETRICS & GYNECOLOGY

## 2019-10-17 NOTE — PROGRESS NOTES
59 y.o.     Female presents as new patient for evaluation of  Post coital bleeding       Patient with long hx of Cervical Cancer > 40 yrs ago had Total Vaginal Hyst , and as well 10 yrs ago had Xlap , to remove scar tissue and ovary , with prior hx of Ectopic pregnancy .   Patient stats sex is painful , dry and red blood - brown blood , after sex . Patient as well with some urinary frequency , no dysuria ,   Subjective:Pain:  Pain:  Nature: aching, intermittent, Intensity: moderate, Location: deep pelvis, Radiation: Non-radiating  diarrhea :  No          Vaginal discharge: no discharge   Vaginal Bleeding: spotting  Dysmenorrhea:negative, Dyspareunia:positive  Problems with contraception: not tested     LMP:  No LMP recorded. Patient has had a hysterectomy.  ROS:  Neurological:Denies, headaches, total body ,neck and hip pains and on chronic meds for this   Breast:{Denies breast tenderness, mass, discharge, changes in size or contour, or abnormal cyclic discomfort.  GastroIntestinalNo change in bowel habits  No unintentional weight loss  No post prandial pain  Genito-urinary:{Positive for frequency   Menstrual: bleeding after menopause  All other systems reviewed : and are Negative  PMH:  Past Medical History:   Diagnosis Date   • Arthritis     generalized   • Cancer (HCC)     cervical   • Chronic low back pain 3/2/2010   • Dental disorder     MISSING TEETH   • Indigestion    • Other specified symptom associated with female genital organs     Cervcial CA; has had total hysterectomy   • Pain     7/10   • Polypoid degeneration of vocal cord 2010   • Right hip pain 3/2/2010   • Snoring      Past Surgical History:   Procedure Laterality Date   • HIP ARTHROPLASTY TOTAL  10/29/2013    Performed by Carlos Alberto Yao M.D. at SURGERY [facility]   • CERVICAL FUSION POSTERIOR  2011    Performed by JOEL BLACK at SURGERY [facility]   • CERVICAL LAMINECTOMY POSTERIOR  2011    Performed by WAYNE  JOEL JEREZ at SURGERY [facility]S   • CERVICAL DECOMPRESSION POSTERIOR  4/27/2011    Performed by JOEL BLACK at SURGERY [facility]S   • INJ,SACROILIAC,ANES/STEROID Bilateral 1/21/2011    Procedure: W/FLUORO;  Surgeon: Ben Matute D.O.;  Location: [facility];  Service: Pain Management   • FLUOROGUIDE FOR SPINAL INJECT  1/21/2011    Procedure: FLUOROGUIDE FOR SPINAL INJ;  Surgeon: Ben Matute D.O.;  Location: SURGERY [facility];  Service:    • LARYNGOSCOPY  9/23/2010    Performed by THOMAS BLACK at SURGERY [facility] DAY [facility]S   • VOCAL CORD STRIPPING  9/23/2010    Performed by THOMAS BLACK at SURGERY SAME DAY [facility] ORS   • OTHER ORTHOPEDIC SURGERY  2002    rig hip replacement   • ABDOMINAL HYSTERECTOMY TOTAL  1980, 1995    cervical cancer   • HIP ARTHROPLASTY TOTAL      RIGHT     Prior Uterine Surgery  Family History   Problem Relation Age of Onset   • Diabetes Other    • Heart Disease Other    • Stroke Other    • Arthritis Other      Social History     Socioeconomic History   • Marital status:      Spouse name: Not on file   • Number of children: Not on file   • Years of education: Not on file   • Highest education level: Not on file   Occupational History   • Not on file   Social Needs   • Financial resource strain: Not on file   • Food insecurity:     Worry: Not on file     Inability: Not on file   • Transportation needs:     Medical: Not on file     Non-medical: Not on file   Tobacco Use   • Smoking status: Current Every Day Smoker     Packs/day: 1.00     Years: 20.00     Pack years: 20.00     Types: Cigarettes   • Smokeless tobacco: Never Used   • Tobacco comment: 1 1/2 pack/day FOR 25 YEARS    Substance and Sexual Activity   • Alcohol use: No   • Drug use: No   • Sexual activity: Yes     Partners: Male     Birth control/protection: Female Sterilization   Lifestyle   • Physical activity:     Days per week: Not on file     Minutes per session: Not on  file   • Stress: Not on file   Relationships   • Social connections:     Talks on phone: Not on file     Gets together: Not on file     Attends Taoism service: Not on file     Active member of club or organization: Not on file     Attends meetings of clubs or organizations: Not on file     Relationship status: Not on file   • Intimate partner violence:     Fear of current or ex partner: Not on file     Emotionally abused: Not on file     Physically abused: Not on file     Forced sexual activity: Not on file   Other Topics Concern   • Not on file   Social History Narrative   • Not on file       Current Outpatient Medications on File Prior to Visit   Medication Sig Dispense Refill   • tramadol (ULTRAM) 50 MG Tab TAKE 1 TABLET BY MOUTH FOUR TIMES A DAY. DNF 08/16,M96.1  2   • baclofen (LIORESAL) 10 MG Tab TAKE 1 TABLET BY MOUTH THREE TIMES A DAY FOR MUSCLE RELAXATION  2   • albuterol 108 (90 Base) MCG/ACT Aero Soln inhalation aerosol Inhale 2 Puffs by mouth every 6 hours as needed for Shortness of Breath. (Patient not taking: Reported on 8/22/2019) 1 Inhaler 0   • Amitriptyline HCl (ELAVIL PO) Take  by mouth.     • oxycodone (OXY-IR) 15 MG immediate release tablet Take 15 mg by mouth every 8 hours as needed.     • morphine SR (MS CONTIN) 30 MG TBCR tablet Take 30 mg by mouth 3 times a day.       No current facility-administered medications on file prior to visit.        Summary of Allergies:  Asa [aspirin]; Gabapentin; and Nsaids  /64   Wt 59 kg (130 lb)   BMI 21.63 kg/m²   Exam:  Pleasant  Female , with markedly poor dentition  Skin: Warm and dry with no lesions or rashes.  Lymph: no inguinal nodes  Neuro: Awake, alert and oriented x 3  ENT:Normal  Eyes: corneas clear and conjunctivae and sclerae normal  BREAST: negative  Abdominal :   negative findings: symmetric, bowel sounds normal, liver span normal to percussion, spleen non-palpable  Genito-Urinary:Perineum and external genitalia normal pale yellowish  hue , thin vaginal mucosa , cuff intact , but thin walled , no masses , no bleeding on contact , no vaginal lesions seen , but very dry , and parched  Bimanual , + 2 tender , bladder , anterior vagina   Extremities:no cyanosis, clubbing or edema present    Psych: normal affect  LAB:  Lab:No results found for this or any previous visit (from the past 336 hour(s)).      Ultrasounds:9/11/2019 1:26 PM     HISTORY/REASON FOR EXAM:  Vaginal Bleeding; post menopausal bleeding, hysterectomy, history of cervical cancer  Post coitus bleeding x 2 yrs ?     TECHNIQUE/EXAM DESCRIPTION:  Transvaginal pelvic ultrasound.     COMPARISON:   None     FINDINGS:     Both transabdominal and transvaginal scanning was performed to optimally visualize the pelvis.     UTERUS:     The uterus is surgically absent.     OVARIES:     Bilateral ovaries are not surgically absent.     No adnexal masses are seen.     There is no free fluid seen.              1. No abnormality detected.    [unfilled]   9/2019 normal    Ass:  Marked Vaginal atrophy    Post-coital bleeding   menopausal state   Dyspareunia   Spent 30 minutes minutes with the patient ; Face to Face, with >50% of this time spent in counseling and coordination of care, surrounding the above mentioned issues as well as:  P.  breast self exam and use and side effects of HRT  Recommend ERT : 1/4 applicator per vagina QHS  X 2week , then HS 2x/week  RTC 6 weeks

## 2019-10-17 NOTE — NON-PROVIDER
Pt here NP postmenopausal bleeding  Pt states bright red bleeding after intercourse  Good#[phone]  Pharmacy verified

## 2019-10-30 ENCOUNTER — OFFICE VISIT (OUTPATIENT)
Dept: MEDICAL GROUP | Facility: PHYSICIAN GROUP | Age: 59
End: 2019-10-30
Payer: COMMERCIAL

## 2019-10-30 VITALS
WEIGHT: 129 LBS | TEMPERATURE: 98.6 F | HEART RATE: 62 BPM | SYSTOLIC BLOOD PRESSURE: 122 MMHG | BODY MASS INDEX: 21.49 KG/M2 | OXYGEN SATURATION: 96 % | HEIGHT: 65 IN | RESPIRATION RATE: 16 BRPM | DIASTOLIC BLOOD PRESSURE: 80 MMHG

## 2019-10-30 DIAGNOSIS — N95.0 POSTMENOPAUSAL BLEEDING: ICD-10-CM

## 2019-10-30 DIAGNOSIS — M25.551 RIGHT HIP PAIN: ICD-10-CM

## 2019-10-30 DIAGNOSIS — R79.89 ELEVATED LIVER FUNCTION TESTS: ICD-10-CM

## 2019-10-30 DIAGNOSIS — R79.89 ABNORMAL CBC: ICD-10-CM

## 2019-10-30 PROCEDURE — 99214 OFFICE O/P EST MOD 30 MIN: CPT | Performed by: NURSE PRACTITIONER

## 2019-10-30 RX ORDER — AMITRIPTYLINE HYDROCHLORIDE 50 MG/1
TABLET, FILM COATED ORAL
Refills: 2 | COMMUNITY
Start: 2019-09-04

## 2019-10-30 NOTE — ASSESSMENT & PLAN NOTE
Patient was found to have mildly abnormal CBC, mildly elevated MCV.  She does deny symptoms associated with this.  Does deny vitamin deficiencies or excessive use of Tylenol or alcohol.  We will repeat labs, she will be notified of results and further actions if needed.

## 2019-10-30 NOTE — PATIENT INSTRUCTIONS
Will repeat labs    Continue care with OB/GYN    Ask pharmacy about discount program like [facility], can also look  coupons    Follow up with me in 6 months, sooner

## 2019-10-30 NOTE — ASSESSMENT & PLAN NOTE
Patient does have chronic hip pain on the right, status post hip replacement.  When she establish care with me in late August she reported to me that she was also exposed to cobalt because of hip implants, she did request a cobalt level which was within normal limits.  She is also managed by pain management specialist and is currently on tramadol.

## 2019-10-30 NOTE — PROGRESS NOTES
Chief Complaint   Patient presents with   • Follow-Up     labs completed 09/2019         This is a 59 y.o.female patient that presents today with the following: Follow-up, review labs    Right Hip Pain  Patient does have chronic hip pain on the right, status post hip replacement.  When she establish care with me in late August she reported to me that she was also exposed to cobalt because of hip implants, she did request a cobalt level which was within normal limits.  She is also managed by pain management specialist and is currently on tramadol.    Postmenopausal bleeding  Patient here for follow-up, she was referred to OB/GYN at her last visit due to postmenopausal bleeding.  She was started on Premarin cream but states that she cannot afford this, cannot afford the co-pay.  We discussed other options including prescription saving programs and  coupons.  She does have an upcoming follow-up appointment with OB/GYN in 3 weeks.  She has not had any more instances of postmenopausal bleeding.    Elevated liver function tests  Patient was found to have mildly elevated liver function tests, mildly elevated ALT and AST, does not have previous history of elevated liver function and it does not carry diagnosis of liver disease.  Discussed various causes of this and that we will repeat labs in the next 3 to 4 weeks.    Abnormal CBC  Patient was found to have mildly abnormal CBC, mildly elevated MCV.  She does deny symptoms associated with this.  Does deny vitamin deficiencies or excessive use of Tylenol or alcohol.  We will repeat labs, she will be notified of results and further actions if needed.      Gynecology Visit on 10/17/2019   Component Date Value   • POC Leukocyte Esterase 10/17/2019 Moderate    • POC Nitrites 10/17/2019 Negative    • POC Protein 10/17/2019 Negative    • POC Urine PH 10/17/2019 5.0    • POC Blood 10/17/2019 Negative    • POC Specific Gravity 10/17/2019 1.015    • POC Ketones 10/17/2019  Negative    • POC Glucose 10/17/2019 Negative          clinical course has been stable    Past Medical History:   Diagnosis Date   • Arthritis     generalized   • Cancer (HCC)     cervical   • Chronic low back pain 3/2/2010   • Dental disorder     MISSING TEETH   • Indigestion    • Other specified symptom associated with female genital organs     Cervcial CA; has had total hysterectomy   • Pain     7/10   • Polypoid degeneration of vocal cord 5/6/2010   • Right hip pain 3/2/2010   • Snoring        Past Surgical History:   Procedure Laterality Date   • HIP ARTHROPLASTY TOTAL  10/29/2013    Performed by Carlos Alberto Yao M.D. at SURGERY [facility]S   • CERVICAL FUSION POSTERIOR  4/27/2011    Performed by JOEL BLACK at [facility]S   • CERVICAL LAMINECTOMY POSTERIOR  4/27/2011    Performed by JOEL BLACK at [facility]S   • CERVICAL DECOMPRESSION POSTERIOR  4/27/2011    Performed by JOEL BLACK at SURGERY [facility]   • INJ,SACROILIAC,ANES/STEROID Bilateral 1/21/2011    Procedure: W/FLUORO;  Surgeon: Ben Matute D.O.;  Location: [facility];  Service: Pain Management   • FLUOROGUIDE FOR SPINAL INJECT  1/21/2011    Procedure: FLUOROGUIDE FOR SPINAL INJ;  Surgeon: Ben Matute D.O.;  Location: [facility];  Service:    • LARYNGOSCOPY  9/23/2010    Performed by THOMAS BLACK at SURGERY SAME DAY [facility] ORS   • VOCAL CORD STRIPPING  9/23/2010    Performed by THOMAS BLACK at SURGERY SAME DAY [facility] ORS   • OTHER ORTHOPEDIC SURGERY  2002    [facility] hip replacement   • ABDOMINAL HYSTERECTOMY TOTAL  1980, 1995    cervical cancer   • HIP ARTHROPLASTY TOTAL      RIGHT       Family History   Problem Relation Age of Onset   • Diabetes Other    • Heart Disease Other    • Stroke Other    • Arthritis Other        Asa [aspirin]; Gabapentin; and Nsaids    Current Outpatient Medications Ordered in Epic   Medication Sig Dispense Refill   • tramadol  "(ULTRAM) 50 MG Tab TAKE 1 TABLET BY MOUTH FOUR TIMES A DAY. DNF 08/16,M96.1  2   • baclofen (LIORESAL) 10 MG Tab TAKE 1 TABLET BY MOUTH THREE TIMES A DAY FOR MUSCLE RELAXATION  2   • albuterol 108 (90 Base) MCG/ACT Aero Soln inhalation aerosol Inhale 2 Puffs by mouth every 6 hours as needed for Shortness of Breath. 1 Inhaler 0   • amitriptyline (ELAVIL) 50 MG Tab TAKE 3 TABLETS BY MOUTH AT BEDTIME AS NEEDED FOR PAIN/SLEEP  2   • estrogens, conjugated (PREMARIN) 0.625 MG/GM Cream 0.5gm ( 1/4applicator) per vagina QHS x2weeks , then HS 2x/week (Patient not taking: Reported on 10/30/2019) 1 Tube 2     No current Epic-ordered facility-administered medications on file.        Constitutional ROS: No unexpected change in weight, No weakness, No unexplained fevers, sweats, or chills  Pulmonary ROS: No chronic cough, sputum, or hemoptysis, No shortness of breath, No recent change in breathing.  Positive for smoker  Cardiovascular ROS: No chest pain, No edema, No palpitations  Gastrointestinal ROS: No abdominal pain, No nausea, vomiting, diarrhea, or constipation.  Positive for elevated liver function test  Musculoskeletal/Extremities ROS: Positive for chronic hip pain  Neurologic ROS: Normal development, No seizures, No weakness   ROS: Positive per HPI    Physical exam:  /80   Pulse 62   Temp 37 °C (98.6 °F) (Temporal)   Resp 16   Ht 1.651 m (5' 5\")   Wt 58.5 kg (129 lb)   SpO2 96%   BMI 21.47 kg/m²   General Appearance: alert, no distress, well-nourished, well-groomed  Skin: Skin color, texture, turgor normal. No rashes or lesions.  Lungs: negative findings: normal respiratory rate and rhythm, normal effort  Musculoskeletal: negative findings: no evidence of joint instability, no evidence of muscle atrophy, no deformities present  Neurologic: intact, CN II through XII grossly intact    Medical decision making/discussion: Patient will continue care per specialist including OB/GYN.  We will have her repeat labs " again in 3 to 4 weeks, she will be notified of results and further actions if needed.  States she will get a flu shot the pain management as she does this every year.  She is otherwise follow-up with me in 6 months, sooner if needed.    [place] was seen today for follow-up.    Diagnoses and all orders for this visit:    Postmenopausal bleeding    Right hip pain    Elevated liver function tests  -     Comp Metabolic Panel; Future    Abnormal CBC  -     CBC WITH DIFFERENTIAL; Future        Return in about 6 months (around 4/30/2020) for Follow-up, Discuss Labs.        Please note that this dictation was created using voice recognition software. I have made every reasonable attempt to correct obvious errors, but I expect that there are errors of grammar and possibly content that I did not discover before finalizing the note.

## 2019-10-30 NOTE — ASSESSMENT & PLAN NOTE
Patient was found to have mildly elevated liver function tests, mildly elevated ALT and AST, does not have previous history of elevated liver function and it does not carry diagnosis of liver disease.  Discussed various causes of this and that we will repeat labs in the next 3 to 4 weeks.

## 2019-10-30 NOTE — ASSESSMENT & PLAN NOTE
Patient here for follow-up, she was referred to OB/GYN at her last visit due to postmenopausal bleeding.  She was started on Premarin cream but states that she cannot afford this, cannot afford the co-pay.  We discussed other options including prescription saving programs and  coupons.  She does have an upcoming follow-up appointment with OB/GYN in 3 weeks.  She has not had any more instances of postmenopausal bleeding.

## 2020-06-03 ENCOUNTER — PATIENT OUTREACH (OUTPATIENT)
Dept: SCHEDULING | Facility: IMAGING CENTER | Age: 60
End: 2020-06-03

## 2020-06-03 NOTE — PROGRESS NOTES
Outcome: Left Message  Please transfer to Patient Outreach Team at [phone] when patient returns call.  Attempt # 1 AA    Outcome: Left Message  Please transfer to Patient Outreach Team at [phone] when patient returns call.  Attempt # 2 AA

## 2020-06-30 NOTE — PROGRESS NOTES
Outcome: Left Message    Please transfer to Patient Outreach Team at [phone] when patient returns call.      Attempt # 3